# Patient Record
Sex: FEMALE | Race: WHITE | HISPANIC OR LATINO | Employment: OTHER | URBAN - METROPOLITAN AREA
[De-identification: names, ages, dates, MRNs, and addresses within clinical notes are randomized per-mention and may not be internally consistent; named-entity substitution may affect disease eponyms.]

---

## 2020-05-16 ENCOUNTER — NEW PATIENT COMPREHENSIVE (OUTPATIENT)
Dept: URBAN - METROPOLITAN AREA CLINIC 35 | Facility: CLINIC | Age: 65
End: 2020-05-16

## 2020-05-16 DIAGNOSIS — H52.4: ICD-10-CM

## 2020-05-16 DIAGNOSIS — H52.203: ICD-10-CM

## 2020-05-16 DIAGNOSIS — H52.03: ICD-10-CM

## 2020-05-16 PROCEDURE — 92015 DETERMINE REFRACTIVE STATE: CPT

## 2020-05-16 PROCEDURE — 92004 COMPRE OPH EXAM NEW PT 1/>: CPT

## 2020-05-16 ASSESSMENT — VISUAL ACUITY
OD_CC: 20/20
OD_SC: J12
OD_CC: J1
OS_SC: 20/200
OS_CC: 20/25-1
OS_CC: J2
OS_SC: J12
OD_SC: 20/200

## 2020-05-16 ASSESSMENT — TONOMETRY
OD_IOP_MMHG: 14
OS_IOP_MMHG: 14

## 2020-06-13 ENCOUNTER — GLASSES RECHECK (OUTPATIENT)
Dept: URBAN - METROPOLITAN AREA CLINIC 35 | Facility: CLINIC | Age: 65
End: 2020-06-13

## 2020-06-13 DIAGNOSIS — H25.813: ICD-10-CM

## 2020-06-13 DIAGNOSIS — H52.03: ICD-10-CM

## 2020-06-13 DIAGNOSIS — H52.4: ICD-10-CM

## 2020-06-13 DIAGNOSIS — H52.203: ICD-10-CM

## 2020-06-13 PROCEDURE — 92015GRNC REFRACTION GLASSES RECHECK - NO CHARGE

## 2020-10-15 NOTE — PATIENT DISCUSSION
Continue: [address] (brinzolamide-brimonidine): drops,suspension: 1-0.2% 1 drop twice a day as directed into both eyes

## 2021-05-27 NOTE — PATIENT DISCUSSION
GLAUCOMA:  I have talked with the patient about my impressions, explained our treatment plan, and have answered all questions and patient understands. Continue present eye drops. Patient to follow up in 6 months.  Dilate and OCT

## 2021-05-27 NOTE — PATIENT DISCUSSION
Continue: [facility] (brinzolamide-brimonidine): drops,suspension: 1-0.2% 1 drop twice a day as directed into both eyes

## 2022-07-23 ENCOUNTER — COMPREHENSIVE EXAM (OUTPATIENT)
Dept: URBAN - METROPOLITAN AREA CLINIC 35 | Facility: CLINIC | Age: 67
End: 2022-07-23

## 2022-07-23 DIAGNOSIS — H25.13: ICD-10-CM

## 2022-07-23 DIAGNOSIS — H43.813: ICD-10-CM

## 2022-07-23 PROCEDURE — 92014 COMPRE OPH EXAM EST PT 1/>: CPT

## 2022-07-23 PROCEDURE — 92250 FUNDUS PHOTOGRAPHY W/I&R: CPT

## 2022-07-23 ASSESSMENT — TONOMETRY
OS_IOP_MMHG: 16
OD_IOP_MMHG: 16

## 2022-07-23 ASSESSMENT — VISUAL ACUITY
OD_CC: J4
OS_CC: J3
OD_CC: 20/25-2
OS_CC: 20/25-2

## 2022-12-15 NOTE — PATIENT DISCUSSION
She has good central vision. VF has arcuate change into central 10 degrees. IOP at target. Continue glaucoma drops. She is compliant with these.